# Patient Record
Sex: FEMALE | Race: WHITE | NOT HISPANIC OR LATINO | Employment: FULL TIME | URBAN - METROPOLITAN AREA
[De-identification: names, ages, dates, MRNs, and addresses within clinical notes are randomized per-mention and may not be internally consistent; named-entity substitution may affect disease eponyms.]

---

## 2017-10-31 ENCOUNTER — HOSPITAL ENCOUNTER (EMERGENCY)
Facility: HOSPITAL | Age: 52
Discharge: HOME | End: 2017-10-31
Attending: EMERGENCY MEDICINE | Admitting: EMERGENCY MEDICINE
Payer: COMMERCIAL

## 2017-10-31 VITALS
SYSTOLIC BLOOD PRESSURE: 148 MMHG | HEART RATE: 98 BPM | DIASTOLIC BLOOD PRESSURE: 84 MMHG | WEIGHT: 158 LBS | RESPIRATION RATE: 18 BRPM | TEMPERATURE: 98.7 F | OXYGEN SATURATION: 95 %
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
## 2017-10-31 PROCEDURE — 90715 TDAP VACCINE 7 YRS/> IM: CPT | Performed by: EMERGENCY MEDICINE

## 2017-10-31 PROCEDURE — 90675 RABIES VACCINE IM: CPT | Performed by: EMERGENCY MEDICINE

## 2017-10-31 PROCEDURE — 99283 EMERGENCY DEPT VISIT LOW MDM: CPT

## 2017-10-31 PROCEDURE — 90471 IMMUNIZATION ADMIN: CPT

## 2017-10-31 PROCEDURE — 90472 IMMUNIZATION ADMIN EACH ADD: CPT

## 2017-10-31 RX ADMIN — RABIES VACCINE 1 ML: KIT at 21:28

## 2017-10-31 RX ADMIN — TETANUS TOXOID, REDUCED DIPHTHERIA TOXOID AND ACELLULAR PERTUSSIS VACCINE, ADSORBED 0.5 ML: 5; 2.5; 8; 8; 2.5 SUSPENSION INTRAMUSCULAR at 21:36

## 2017-11-01 NOTE — DISCHARGE INSTRUCTIONS
Animal Bite   WHAT YOU NEED TO KNOW:   Animal bite injuries range from shallow cuts to deep, life-threatening wounds  An animal can cut or puncture the skin when it bites  Your skin may be torn from your body  Your skin may swell or bruise even if the bite does not break the skin  Animal bites occur more often on the hands, arms, legs, and face  Bites from dogs and cats are the most common injuries  DISCHARGE INSTRUCTIONS:   Seek care immediately if:   · You have a fever  · Your wound is red, swollen, and draining pus  · You see red streaks on the skin around the wound  · You can no longer move the bitten area  · Your heartbeat and breathing are much faster than usual     · You feel dizzy and confused  Contact your healthcare provider if:   · Your pain does not get better, even after you take pain medicine  · You have nightmares or flashbacks about the animal bite  · You have questions or concerns about your condition or care  Medicines: You may need any of the following:  · Antibiotics  prevent or treat a bacterial infection  · Prescription pain medicine  may be given  Ask how to take this medicine safely  · A tetanus vaccine  may be needed to prevent tetanus  Tetanus is a life-threatening bacterial infection that affects the nerves and muscles  The bacteria can be spread through animal bites  · A rabies vaccine  may be needed to prevent rabies  Rabies is a life-threatening viral infection  The virus can be spread through animal bites  · Take your medicine as directed  Contact your healthcare provider if you think your medicine is not helping or if you have side effects  Tell him or her if you are allergic to any medicine  Keep a list of the medicines, vitamins, and herbs you take  Include the amounts, and when and why you take them  Bring the list or the pill bottles to follow-up visits  Carry your medicine list with you in case of an emergency    Follow up with your healthcare provider in 1 to 2 days: You may need to return to have your stitches removed  Write down your questions so you remember to ask them during your visits  Self-care:   · Apply antibiotic ointment as directed  This helps prevent infection in minor skin wounds  It is available without a doctor's order  · Keep the wound clean and covered  Wash the wound every day with soap and water or germ-killing cleanser  Ask your healthcare provider about the kinds of bandages to use  · Apply ice on your wound  Ice helps decrease swelling and pain  Ice may also help prevent tissue damage  Use an ice pack, or put crushed ice in a plastic bag  Cover it with a towel and place it on your wound for 15 to 20 minutes every hour or as directed  · Elevate the wound area  Raise your wound above the level of your heart as often as you can  This will help decrease swelling and pain  Prop your wound on pillows or blankets to keep it elevated comfortably  Prevent another animal bite:   · Learn to recognize the signs of a scared or angry pet  Avoid quick, sudden movements  · Do not step between animals that are fighting  · Do not leave a pet alone with a young child  · Do not disturb an animal while it eats, sleeps, or cares for its young  · Do not approach an animal you do not know, especially one that is tied up or caged  · Stay away from animals that seem sick or act strangely  · Do not feed or capture wild animals  © 2017 [address] Information is for End User's use only and may not be sold, redistributed or otherwise used for commercial purposes  All illustrations and images included in CareNotes® are the copyrighted property of [employer] , [employer]  or Nicholas Dias  The above information is an  only  It is not intended as medical advice for individual conditions or treatments   Talk to your doctor, nurse or pharmacist before following any medical regimen to see if it is safe and effective for you  Rabies Vaccine (By injection)   Rabies Vaccine (RAY-beez VAX-een)  Prevents infection caused by rabies virus  The vaccine can be given before or after you are exposed to the rabies virus  Brand Name(s): Imovax Rabies, RabAvert   There may be other brand names for this medicine  When This Medicine Should Not Be Used: You should not receive a rabies vaccine if you have had an allergic reaction to it before  How to Use This Medicine:   Injectable  · Your doctor will prescribe your exact dose and tell you how often it should be given  This medicine is given as a shot into one of your muscles  The vaccine is injected into the upper arm muscle  Very young or small children may have the vaccine injected into the upper leg muscle  · A nurse or other health provider will give you this medicine  · You are at risk for exposure to the rabies virus if you work with animals or will be going to a country where rabies is common  People who are at risk of being exposed to rabies will receive 3 doses on 3 different days within a 1-month period  · If you have received the vaccine in the past and have been exposed to the rabies virus, you will need to receive 2 doses on 2 different days within a 1-month period  · If you have not yet received the vaccine and were exposed to the rabies virus, you will need a total of 5 doses on 5 different days within a 1-month period  You will also receive a shot of rabies immune globulin  · It is very important that you have the shots on the exact day your doctor tells you to  If a dose is missed:   · You must use this medicine on a fixed schedule  Call your doctor or pharmacist if you miss a dose  Drugs and Foods to Avoid:   Ask your doctor or pharmacist before using any other medicine, including over-the-counter medicines, vitamins, and herbal products    · Tell your doctor before you receive this vaccine if you take medicine that weakens your immune system, such as a steroid (such as dexamethasone, hydrocortisone, methylprednisolone, prednisolone, prednisone, Medrol®) or cancer treatment  Warnings While Using This Medicine:   · Make sure your doctor knows if you are pregnant or breastfeeding  Tell your doctor if you have had an allergic reaction to any type of vaccine, if you have an illness with fever, or if you have an immune system problem  · This medicine is made from donated human blood  Some human blood products have transmitted viruses to people who have received them, although the risk is low  Human donors and donated blood are both tested for viruses to keep the transmission risk low  Talk with your médico about this risk if you are concerned  · Your doctor will do lab tests at regular visits to check on the effects of this medicine  Keep all appointments  Possible Side Effects While Using This Medicine:   Call your doctor right away if you notice any of these side effects:  · Allergic reaction: Itching or hives, swelling in your face or hands, swelling or tingling in your mouth or throat, chest tightness, trouble breathing  · Muscle pain, stiffness, or weakness  · Numbness, tingling, or burning pain in your hands, arms, legs, or feet  If you notice these less serious side effects, talk with your doctor:   · Dizziness, headache  · Fever  · Itching, pain, redness, or swelling where the shot was given  · Nausea, stomach pain  · Tiredness  If you notice other side effects that you think are caused by this medicine, tell your doctor  Call your doctor for medical advice about side effects  You may report side effects to FDA at 5-659-FDA-2417  © 2017 [address]  Information is for End User's use only and may not be sold, redistributed or otherwise used for commercial purposes  The above information is an  only  It is not intended as medical advice for individual conditions or treatments   Talk to your doctor, nurse or pharmacist before following any medical regimen to see if it is safe and effective for you

## 2021-03-31 DIAGNOSIS — Z23 ENCOUNTER FOR IMMUNIZATION: ICD-10-CM

## 2021-06-16 NOTE — ED PROVIDER NOTES
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  pt presents to the ed with a bite darius to the 2nd left finger      Patient presents for evaluation of cat bite to the finger  Left second finger  Patient states rescue cat  Already given Augmentin, but sent in for rabies ppx  PAtient has received the rabies vaccine in the past          History provided by:  Patient   used: No    Cat Bite       None       Past Medical History:   Diagnosis Date    Asthma     Lyme disease        History reviewed  No pertinent surgical history  History reviewed  No pertinent family history  I have reviewed and agree with the history as documented  Social History   Substance Use Topics    Smoking status: Not on file    Smokeless tobacco: Not on file    Alcohol use Not on file        Review of Systems   Skin: Positive for wound  All other systems reviewed and are negative  Physical Exam  ED Triage Vitals [10/31/17 2029]   Temperature Pulse Respirations Blood Pressure SpO2   98 7 °F (37 1 °C) 98 18 148/84 95 %      Temp Source Heart Rate Source Patient Position - Orthostatic VS BP Location FiO2 (%)   Tympanic -- -- -- --      Pain Score       --           Orthostatic Vital Signs  Vitals:    10/31/17 2029   BP: 148/84   Pulse: 98       Physical Exam   Constitutional: She is oriented to person, place, and time  No distress  Cardiovascular: Normal rate and regular rhythm  Pulmonary/Chest: Effort normal and breath sounds normal  No respiratory distress  Musculoskeletal: Normal range of motion  Neurological: She is alert and oriented to person, place, and time  Skin: Capillary refill takes less than 2 seconds  She is not diaphoretic  Nursing note and vitals reviewed        ED Medications  Medications   rabies vaccine, chick embryo (RABAVERT) IM injection 1 mL (1 mL Intramuscular Given 10/31/17 2128) tetanus-diphtheria-acellular pertussis (BOOSTRIX) IM injection 0 5 mL (0 5 mL Intramuscular Given 10/31/17 2131)       Diagnostic Studies  Results Reviewed     None                 No orders to display              Procedures  Procedures       Phone Contacts  ED Phone Contact    ED Course  ED Course                                MDM  Number of Diagnoses or Management Options  Cat bite, initial encounter:   Patient Progress  Patient progress: stable    CritCare Time    Disposition  Final diagnoses:   Cat bite, initial encounter     Time reflects when diagnosis was documented in both MDM as applicable and the Disposition within this note     Time User Action Codes Description Comment    10/31/2017  8:55 PM Butch, [facility] Cat bite, initial encounter       ED Disposition     ED Disposition Condition Comment    Discharge  Gem Ramírez discharge to home/self care  Condition at discharge: stable        Follow-up Information     Follow up With Specialties Details Why [facility] [address] Emergency Department Emergency Medicine In 3 days for second vaccine dose [address] [facility] ED, [place], [place], [address]        There are no discharge medications for this patient  No discharge procedures on file      ED Provider  Electronically Signed by           Nelly Victoria DO  10/31/17 [facility],   10/31/17 [facility]386 [facility]

## 2021-12-31 ENCOUNTER — NURSE TRIAGE (OUTPATIENT)
Dept: OTHER | Facility: OTHER | Age: 56
End: 2021-12-31

## 2021-12-31 DIAGNOSIS — Z20.828 EXPOSURE TO SARS VIRUS: Primary | ICD-10-CM

## 2022-01-02 PROCEDURE — U0005 INFEC AGEN DETEC AMPLI PROBE: HCPCS | Performed by: FAMILY MEDICINE

## 2022-01-02 PROCEDURE — U0003 INFECTIOUS AGENT DETECTION BY NUCLEIC ACID (DNA OR RNA); SEVERE ACUTE RESPIRATORY SYNDROME CORONAVIRUS 2 (SARS-COV-2) (CORONAVIRUS DISEASE [COVID-19]), AMPLIFIED PROBE TECHNIQUE, MAKING USE OF HIGH THROUGHPUT TECHNOLOGIES AS DESCRIBED BY CMS-2020-01-R: HCPCS | Performed by: FAMILY MEDICINE

## 2024-10-25 ENCOUNTER — OFFICE VISIT (OUTPATIENT)
Dept: OBGYN CLINIC | Facility: CLINIC | Age: 59
End: 2024-10-25
Payer: COMMERCIAL

## 2024-10-25 ENCOUNTER — APPOINTMENT (OUTPATIENT)
Dept: RADIOLOGY | Facility: CLINIC | Age: 59
End: 2024-10-25
Payer: COMMERCIAL

## 2024-10-25 VITALS
SYSTOLIC BLOOD PRESSURE: 126 MMHG | DIASTOLIC BLOOD PRESSURE: 84 MMHG | HEART RATE: 76 BPM | WEIGHT: 151.2 LBS | BODY MASS INDEX: 25.19 KG/M2 | RESPIRATION RATE: 16 BRPM | HEIGHT: 65 IN

## 2024-10-25 DIAGNOSIS — M25.521 RIGHT ELBOW PAIN: Primary | ICD-10-CM

## 2024-10-25 DIAGNOSIS — M77.11 LATERAL EPICONDYLITIS OF RIGHT ELBOW: ICD-10-CM

## 2024-10-25 DIAGNOSIS — M25.521 RIGHT ELBOW PAIN: ICD-10-CM

## 2024-10-25 PROCEDURE — 99204 OFFICE O/P NEW MOD 45 MIN: CPT | Performed by: STUDENT IN AN ORGANIZED HEALTH CARE EDUCATION/TRAINING PROGRAM

## 2024-10-25 PROCEDURE — 73080 X-RAY EXAM OF ELBOW: CPT

## 2024-10-25 NOTE — PROGRESS NOTES
ORTHOPAEDIC HAND, WRIST, AND ELBOW OFFICE  VISIT     ASSESSMENT/PLAN:    Claudia García is a 59 y.o. RHD female who presents with right elbow lateral epicondylitis     Etiology and treatment options discussed, all her questions were addressed. Emphasized that this can take 3-6 months to resolve.  Referral to hand therapy provided, I recommend continued course of formal therapy before discussing additional treatment options.  Continue with OTC analgesics as needed for pain, activities as tolerated.         The patient verbalized understanding of exam findings and treatment plan. We engaged in the shared decision-making process and treatment options were discussed at length with the patient. Surgical and conservative management discussed today along with risks and benefits.    Follow Up:  If symptoms worsen or fail to improve       General Discussions:  Lateral Epicondylitis: The anatomy and physiology of lateral epicondylitis was discussed with the patient today.  Typically, a traumatic injury or repetitive use may cause a partial or complete tear of the extensor carpi radialis brevis muscle.  This creates pain over the lateral epicondyle.  This pain typically is made worse with palm down lifting activities as well as anything that involves strength and stability of the wrist.  The pain may radiate from the wrist up to the elbow.  At times, the shoulder may be weak as well which can predispose or cause continuation of the problem.  Conservative treatment usually cures a majority of patients; however, this may take up to 6-9 months.  Conservative treatment options typically include activity modification, therapy for strengthening of the shoulder and elbow, nocturnal wrist support splints, tennis elbow straps, and possible corticosteroid injections.  Corticosteroid injections do not change the natural history of this process, may decrease the pain temporarily, and may increase the risk of recurrence.  Surgery is  "required in fewer than 10% of patients.      ____________________________________________________________________________________________________________________________________________      CHIEF COMPLAINT:  Right elbow pain     SUBJECTIVE:  Claudia García is a 59 y.o. female who presents with right elbow pain. Pain started recently with out injury or trauma. Pain is localized to the lateral elbow, aggravated with lifting heavy items, alleviated with rest. Patient has tried multiple at home treatments with out benefit. Pain does not radiate. She denies weakness or limits in AROM. Denies numbness or paresthesias.   Previous Treatments: NSAIDs, Tylenol, activity modification, and bracing without relief  Associated symptoms: None  Handedness: right  Work status: retired    I have personally reviewed all the relevant PMH, PSH, SH, FH, Medications and allergies      PAST MEDICAL HISTORY:  Past Medical History:   Diagnosis Date    Asthma     Lyme disease        PAST SURGICAL HISTORY:  History reviewed. No pertinent surgical history.    FAMILY HISTORY:  History reviewed. No pertinent family history.    SOCIAL HISTORY:       MEDICATIONS:  No current outpatient medications on file.    ALLERGIES:  Allergies   Allergen Reactions    Sulfa Antibiotics            REVIEW OF SYSTEMS:  Pertinent items are noted in HPI.  A comprehensive review of systems was negative.    VITALS:  Vitals:    10/25/24 0915   BP: 126/84   Pulse: 76   Resp: 16       LABS:  HgA1c: No results found for: \"HGBA1C\"  BMP: No results found for: \"GLUCOSE\", \"CALCIUM\", \"NA\", \"K\", \"CO2\", \"CL\", \"BUN\", \"CREATININE\"    _____________________________________________________  PHYSICAL EXAMINATION:  General: well developed and well nourished, alert, oriented times 3, and appears comfortable  Psychiatric: Normal  HEENT: Normocephalic, Atraumatic Trachea Midline, No torticollis  Pulmonary: No audible wheezing or respiratory distress   Abdomen/GI: Non tender, non distended " "  Cardiovascular: No pitting edema, 2+ radial pulse   Skin: No masses, erythema, lacerations, fluctation, ulcerations  Neurovascular: Sensation Intact to the Median, Ulnar, Radial Nerve, Motor Intact to the Median, Ulnar, Radial Nerve, and Pulses Intact  Musculoskeletal: Normal, except as noted in detailed exam and in HPI.        FOCUSED MUSCULOSKELETAL EXAMINATION:  Right Upper Extremity  Inspection: skin intact, no notable deformity   Palpation: TTP lateral epicondyle   Neurologic: 5/5 elbow flexion, 5/5 elbow extension, 5/5 wrist extension, 5/5 wrist flexion, 5/5 finger flexion, 5/5 finger extension, 5/5 FPL, 5/5 EPL, 5/5 APB, 5/5 intrinsics, sensation intact to median, radial, and ulnar nerve distributions  Vascular: Palpable radial pulse, brisk cap refill <2sec, hand warm and well perfused  MSK:     + pain with resisted wrist extension   no palpable crepitus with passive motion  (-) varus laxity, (-) valgus laxity  Demonstrates normal shoulder, wrist, and finger motion  2+ distal radial pulse with brisk capillary refill to the fingers  Radial, median, and ulnar motor and sensory distrubution intact  Sensation to light touch intact distally     ___________________________________________________  STUDIES REVIEWED:  Xrays of the right elbow were obtained on 10/25/24 were independently reviewed which demonstrates no acute osseous abnormalities     LABS REVIEWED:    HgA1c: No results found for: \"HGBA1C\"  BMP: No results found for: \"GLUCOSE\", \"CALCIUM\", \"NA\", \"K\", \"CO2\", \"CL\", \"BUN\", \"CREATININE\"        PROCEDURES PERFORMED:  Procedures  No Procedures performed today    _____________________________________________________      Scribe Attestation      I,:  Belia Tamez am acting as a scribe while in the presence of the attending physician.:       I,:  Tre Urias MD personally performed the services described in this documentation    as scribed in my presence.:               I agree with the history, " physical examination, assessment and plan of care as documented above.    Tre Urias M.D.  Attending, Orthopaedic Surgery  Hand, Wrist, and Elbow Surgery  [facility]

## 2024-10-30 ENCOUNTER — EVALUATION (OUTPATIENT)
Dept: OCCUPATIONAL THERAPY | Facility: CLINIC | Age: 59
End: 2024-10-30
Payer: COMMERCIAL

## 2024-10-30 DIAGNOSIS — M25.521 RIGHT ELBOW PAIN: ICD-10-CM

## 2024-10-30 DIAGNOSIS — M77.11 LATERAL EPICONDYLITIS OF RIGHT ELBOW: Primary | ICD-10-CM

## 2024-10-30 PROCEDURE — 97110 THERAPEUTIC EXERCISES: CPT

## 2024-10-30 PROCEDURE — 97166 OT EVAL MOD COMPLEX 45 MIN: CPT

## 2024-10-30 NOTE — PROGRESS NOTES
OT Evaluation     Today's date: 10/30/2024  Patient name: Claudia García  : 1965  MRN: [id number]  Referring provider: Tre Urias*  Dx:   Encounter Diagnosis     ICD-10-CM    1. Right elbow pain  M25.521 Ambulatory Referral to PT/OT Hand Therapy      2. Lateral epicondylitis of right elbow  M77.11 Ambulatory Referral to PT/OT Hand Therapy          Start Time: 1130  Stop Time: 1215  Total time in clinic (min): 45 minutes    Assessment  Impairments: activity intolerance, impaired physical strength, lacks appropriate home exercise program, pain with function and weight-bearing intolerance  Symptom irritability: moderate  Understanding of Dx/Px/POC: good     Prognosis: excellent    Goals  Short Term Goals by 2 - 4 weeks:    Establish HEP to increase performance with daily activities.     Patient will increase  strength by at least 5 lbs to assist in completing ADLs/iADLs.         Long Term Goals by discharge:    Establish final home exercise program to enhance maximal functional level with ADLs.    Achieve functional strength of RUE for full return to high level ADLs.     Plan  Patient would benefit from: skilled occupational therapy, OT eval, orthotics and custom splinting  Planned modality interventions: manual electrical stimulation, electrical stimulation/Russian stimulation, high voltage pulsed current: pain management, ultrasound, TENS and thermotherapy: hydrocollator packs    Planned therapy interventions: ADL training, ADL retraining, activity modification, IASTM, joint mobilization, kinesiology taping, manual therapy, neuromuscular re-education, nerve gliding, orthotic fitting/training, orthotic management and training, patient/caregiver education, self care, sensory integrative techniques, strengthening, stretching, therapeutic exercise, therapeutic activities, therapeutic training, IADL retraining, home exercise program, graded motor, graded exercise, graded activity, functional ROM  "exercises, flexibility, fine motor coordination training, coordination, compression, balance/weight bearing training, body mechanics training and behavior modification    Frequency: 2x week  Duration in weeks: 10  Plan of Care beginning date: 10/30/2024  Plan of Care expiration date: 2024  Treatment plan discussed with: patient  Plan details: Focus on HEP, A/AA/PROM, TE, TA, manual therapy, modalities PRN, strengthening, activity modification to improve ability to complete daily activites with ease.  POC 10/30/2024 - 2024        Subjective Evaluation    History of Present Illness  Mechanism of injury: Patient is a 59 y.o. female who presents for OT IE and treatment for R lateral epicondylitis. Patient reports her right elbow pain starting around Mid July, she feels it more when she reaches up high and extends her elbow, bringing in grocery bags in or carrying luggage. She wears counter force brace but it provides minimal relief but resting her arm helps to get rid of the pain. Patient referred by Dr. Tre Urias to initiate treatment including hand therapy.      Quality of life: good    Patient Goals  Patient goals for therapy: decreased pain, increased motion, return to sport/leisure activities, independence with ADLs/IADLs and increased strength    Pain  Current pain ratin  At best pain ratin (\"I can always feels something going on in the elbow\")  At worst pain ratin  Quality: sharp  Relieving factors: rest (advil/aleve PRN seldom)  Aggravating factors: lifting (carying luggage, grocery bags, pulling laundry out of the washer, helps take care of 2 twin grandchildren, a puppy)    Social Support  Lives with: spouse    Working: semi-retired.  Hand dominance: right      Objective     Active Range of Motion     Left Wrist   Normal active range of motion    Right Wrist   Normal active range of motion    Left Thumb   Kapandji score: 10 degrees      Right Thumb   Kapandji score: 10 " degrees    Additional Active Range of Motion Details  Patient able to make bilateral composite fists     Strength/Myotome Testing     Left Wrist/Hand      (2nd hand position)     Trial 1: 65    Comments: Pronated  :  60 lbs     Thumb Strength  Key/Lateral Pinch     Trial 1: 11    Right Wrist/Hand      (2nd hand position)     Trial 1: 40    Comments: Pronated :  35 lbs     Thumb Strength   Key/Lateral Pinch     Trial 1: 12    Tests     Left Elbow   Negative Cozen's.     Right Elbow   Positive Cozen's.     Left Wrist/Hand   Negative resisted middle finger.     Right Wrist/Hand   Positive resisted middle finger.            Precautions: Universal        Auth Tracker  Auth Status Total   Visits  Expiration date Co-Insurance   pending                                  Visit Tracker  Date IE  10/30                                                                                    PMHx:   has a past medical history of Asthma and Lyme disease.       Manuals HEP 10/30/2024                       Ther Ex     Education on HEP and dx x x5min   Extensor stretches 4 ways  x 5 x10 sec holds   STM lateral epicondyle x X5 min   measurements  X10 min   Wrist flex/slow ext  1# x10                        Ther Act                     Modalities     MHP  x5 min               Assessment:   Patient tolerated session well. Patient demonstrates decreased  strength as well discomfort with certain motions upon assessment today. Upon palpation patient does present with increased subjective report of tenderness at the R lateral epicondyle. Patient session focused on patient education on anatomy and physiology concerning current dx, techniques for decreasing deficits through HEP, and appropriate use of modalities. Patient educated on HEP to include wrist extensor muscle stretches, activity modifications with ADLs/iADLs specifically how to travel with luggage/duffel bag/tote bag purse with verbal instructions and handouts for  patient reference. Patient educated on treatment plan at this time. Patient benefiting from skilled hand therapy OT to reduce deficits to improve independence with daily activities.      Plan:   Focus on HEP, A/AA/PROM, TE, TA, manual therapy, modalities PRN, strengthening, activity modification to improve ability to complete daily activites with ease.  POC 10/30/2024 - 12/20/2024

## 2024-11-13 ENCOUNTER — OFFICE VISIT (OUTPATIENT)
Dept: OCCUPATIONAL THERAPY | Facility: CLINIC | Age: 59
End: 2024-11-13
Payer: COMMERCIAL

## 2024-11-13 DIAGNOSIS — M77.11 LATERAL EPICONDYLITIS OF RIGHT ELBOW: Primary | ICD-10-CM

## 2024-11-13 DIAGNOSIS — M25.521 RIGHT ELBOW PAIN: ICD-10-CM

## 2024-11-13 PROCEDURE — 97110 THERAPEUTIC EXERCISES: CPT

## 2024-11-13 NOTE — PROGRESS NOTES
Daily Note     Today's date: 2024  Patient name: Claudia García  : 1965  MRN: [id number]  Referring provider: Tre Urias*  Dx:   Encounter Diagnosis     ICD-10-CM    1. Lateral epicondylitis of right elbow  M77.11       2. Right elbow pain  M25.521                    Subjective: Patient states compliance with HEP, has been moving boxes and luggage while in [place] states minimal pain or soreness in the R elbow.         Objective: See treatment diary below       Precautions: Universal        Auth Tracker - NO AUTH BOMN 150 PCY   Auth Status Total   Visits  Expiration date Co-Insurance                                     Visit Tracker  Date IE  10/30 11/13                                                                                   PMHx:   has a past medical history of Asthma and Lyme disease.       Manuals HEP 2024                       Ther Ex     Education on HEP and dx x x5min   Extensor stretches 4 ways  x 5 x10-20 sec holds   STM lateral epicondyle/PROM x X8 min   measurements     Wrist flex/slow ext  1# x10    Digital extension with rubber band   Red x10    Kenny twist with flexbar, N's/U's  Red x10    Tabletop stretch  5 x10-20 sec holds   Flexor stretch   5 x10 sec   Prayer stretch   5 x10 sec        Ther Act                     Modalities     MHP  x5 min               Assessment:   Patient tolerated session well with good activity tolerance and minimal reports of tenderness or discomfort. Patient session focused on patient education on anatomy and physiology concerning current dx, techniques for decreasing deficits through HEP, and appropriate use of modalities. Therapist reviewed HEP to include wrist extensor muscle stretches, activity modifications with ADLs/iADLs specifically with verbal instructions for patient reference. Patient educated on treatment plan at this time. Patient benefiting from skilled hand therapy OT to reduce deficits to improve independence with  daily activities.        Plan:   Focus on HEP, A/AA/PROM, TE, TA, manual therapy, modalities PRN, strengthening, activity modification to improve ability to complete daily activites with ease.  POC 10/30/2024 - 12/20/2024

## 2024-11-21 ENCOUNTER — OFFICE VISIT (OUTPATIENT)
Dept: OCCUPATIONAL THERAPY | Facility: CLINIC | Age: 59
End: 2024-11-21
Payer: COMMERCIAL

## 2024-11-21 DIAGNOSIS — M77.11 LATERAL EPICONDYLITIS OF RIGHT ELBOW: Primary | ICD-10-CM

## 2024-11-21 DIAGNOSIS — M25.521 RIGHT ELBOW PAIN: ICD-10-CM

## 2024-11-21 PROCEDURE — 97110 THERAPEUTIC EXERCISES: CPT

## 2024-11-21 NOTE — PROGRESS NOTES
"Daily Note     Today's date: 2024  Patient name: Claudia García  : 1965  MRN: [id number]  Referring provider: Tre Urias*  Dx:   Encounter Diagnosis     ICD-10-CM    1. Lateral epicondylitis of right elbow  M77.11       2. Right elbow pain  M25.521                      Subjective: Patient states, \"sometimes it's fine I forget there's any pain in the elbow and other times it's a sharp reminder\".        Objective: See treatment diary below       Precautions: Universal        Auth Tracker - NO AUTH BOMN 150 PCY   Auth Status Total   Visits  Expiration date Co-Insurance                                     Visit Tracker  Date IE  10/30 11/13 11/21   X                                                                               PMHx:   has a past medical history of Asthma and Lyme disease.       Manuals HEP 2024                       Ther Ex     Education on HEP and dx x x5min   Extensor stretches 4 ways  x 5 x10-20 sec holds   STM lateral epicondyle/PROM x X8 min   measurements     Wrist flex/slow ext  1# x10    Digital extension with rubber band   Red x10    Kenny twist with flexbar, N's/U's  Red x10    Tabletop stretch  5 x10-20 sec holds   Flexor stretch   5 x10 sec   Prayer stretch   5 x10 sec   Supination/pronation  X10 1 wts.                  Ther Act                     Modalities     MHP  x5 min               Assessment:   Patient tolerated session well with good activity tolerance and minimal reports of tenderness or discomfort. Patient session focused on patient education on anatomy and physiology concerning current dx, techniques for decreasing deficits through HEP, and appropriate use of modalities. Patient educated on treatment plan at this time. Patient benefiting from skilled hand therapy OT to reduce deficits to improve independence with daily activities.        Plan:   Focus on HEP, A/AA/PROM, TE, TA, manual therapy, modalities PRN, strengthening, activity modification to " improve ability to complete daily activites with ease.  POC 10/30/2024 - 12/20/2024

## 2024-11-26 ENCOUNTER — OFFICE VISIT (OUTPATIENT)
Dept: OCCUPATIONAL THERAPY | Facility: CLINIC | Age: 59
End: 2024-11-26
Payer: COMMERCIAL

## 2024-11-26 DIAGNOSIS — M77.11 LATERAL EPICONDYLITIS OF RIGHT ELBOW: Primary | ICD-10-CM

## 2024-11-26 DIAGNOSIS — M25.521 RIGHT ELBOW PAIN: ICD-10-CM

## 2024-11-26 PROCEDURE — 97110 THERAPEUTIC EXERCISES: CPT

## 2024-11-26 NOTE — PROGRESS NOTES
Daily Note     Today's date: 2024  Patient name: Claudia García  : 1965  MRN: [id number]  Referring provider: Tre Urias*  Dx:   Encounter Diagnosis     ICD-10-CM    1. Lateral epicondylitis of right elbow  M77.11       2. Right elbow pain  M25.521                    Subjective: Patient states improvement but still feels difficulty when lifting reusable grocery bags.         Objective: See treatment diary below       Precautions: Universal      Auth Tracker - NO AUTH BOMN 150 PCY   Auth Status Total   Visits  Expiration date Co-Insurance                                     Visit Tracker  Date IE  10/30 11/13 11/21 11/26   X                                                                               PMHx:   has a past medical history of Asthma and Lyme disease.       Manuals HEP 2024                       Ther Ex     Education on HEP and dx x x5min   Extensor stretches 4 ways  x 5 x10-20 sec holds   STM lateral epicondyle/PROM x X8 min   measurements     Wrist flex/slow ext  1# x10    Digital extension with rubber band   Red x10    Kenny twist with flexbar, N's/U's  Red x10    Tabletop stretch  5 x10-20 sec holds   Flexor stretch   5 x10 sec   Prayer stretch   5 x10 sec   Supination/pronation  X10 1 wts.                  Ther Act                     Modalities     MHP  x5 min               Assessment:   Patient tolerated session well with good activity tolerance and no reports of tenderness or discomfort. Patient session focused on patient education on anatomy and physiology concerning current dx, techniques for decreasing deficits through HEP, and appropriate use of modalities. Patient educated on treatment plan at this time. Patient benefiting from skilled hand therapy OT to reduce deficits to improve independence with daily activities.        Plan:   Focus on HEP, A/AA/PROM, TE, TA, manual therapy, modalities PRN, strengthening, activity modification to improve ability to complete  daily activites with ease.  POC 10/30/2024 - 12/20/2024

## 2024-12-03 ENCOUNTER — OFFICE VISIT (OUTPATIENT)
Dept: OCCUPATIONAL THERAPY | Facility: CLINIC | Age: 59
End: 2024-12-03
Payer: COMMERCIAL

## 2024-12-03 DIAGNOSIS — M25.521 RIGHT ELBOW PAIN: ICD-10-CM

## 2024-12-03 DIAGNOSIS — M77.11 LATERAL EPICONDYLITIS OF RIGHT ELBOW: Primary | ICD-10-CM

## 2024-12-03 PROCEDURE — 97110 THERAPEUTIC EXERCISES: CPT

## 2024-12-03 NOTE — PROGRESS NOTES
OT Re-Evaluation     Today's date: 12/3/2024  Patient name: Claudia García  : 1965  MRN: [id number]  Referring provider: Tre Urias*  Dx:   Encounter Diagnosis     ICD-10-CM    1. Right elbow pain  M25.521       2. Lateral epicondylitis of right elbow  M77.11           Start Time: 1630  Stop Time: 1715  Total time in clinic (min): 45 minutes    Assessment  Impairments: activity intolerance, impaired physical strength, lacks appropriate home exercise program, pain with function and weight-bearing intolerance  Symptom irritability: low  Understanding of Dx/Px/POC: good     Prognosis: excellent    Goals  Short Term Goals by 2 - 4 weeks:    Establish HEP to increase performance with daily activities. MET    Patient will increase  strength by at least 5 lbs to assist in completing ADLs/iADLs. MET      Updated ST weeks  Patient will decrease subjective report of pain by 2 points on the VAS.        Long Term Goals by discharge:    Establish final home exercise program to enhance maximal functional level with ADLs.    Achieve functional strength of RUE for full return to high level ADLs.     Plan  Patient would benefit from: skilled occupational therapy, OT eval, orthotics and custom splinting  Planned modality interventions: manual electrical stimulation, electrical stimulation/Russian stimulation, high voltage pulsed current: pain management, ultrasound, TENS and thermotherapy: hydrocollator packs    Planned therapy interventions: ADL training, ADL retraining, activity modification, IASTM, joint mobilization, kinesiology taping, manual therapy, neuromuscular re-education, nerve gliding, orthotic fitting/training, orthotic management and training, patient/caregiver education, self care, sensory integrative techniques, strengthening, stretching, therapeutic exercise, therapeutic activities, therapeutic training, IADL retraining, home exercise program, graded motor, graded exercise, graded activity,  functional ROM exercises, flexibility, fine motor coordination training, coordination, compression, balance/weight bearing training, body mechanics training and behavior modification    Frequency: 1-2x week  Duration in weeks: 10  Plan of Care beginning date: 12/3/2024  Plan of Care expiration date: 2025  Treatment plan discussed with: patient  Plan details: Focus on HEP, A/AA/PROM, TE, TA, manual therapy, modalities PRN, strengthening, activity modification to improve ability to complete daily activites with ease.  POC 12/3/2024 - 2025        Subjective Evaluation    History of Present Illness  Mechanism of injury: Patient is a 59 y.o. female who presents for OT IE and treatment for R lateral epicondylitis. Patient reports her right elbow pain starting around Mid July, she feels it more when she reaches up high and extends her elbow, bringing in grocery bags in or carrying luggage. She wears counter force brace but it provides minimal relief but resting her arm helps to get rid of the pain. Patient referred by Dr. Tre Urias to initiate treatment including hand therapy.      Quality of life: good    Patient Goals  Patient goals for therapy: decreased pain, return to sport/leisure activities, independence with ADLs/IADLs and increased strength    Pain  Current pain ratin  At best pain ratin  At worst pain ratin  Quality: sharp  Relieving factors: rest (advil/aleve PRN seldom)  Exacerbated by: carying luggage, helps take care of 2 twin grandchildren.    Social Support  Lives with: spouse    Working: semi-retired.  Hand dominance: right      Objective     Active Range of Motion     Left Wrist   Normal active range of motion    Right Wrist   Normal active range of motion    Left Thumb   Kapandji score: 10 degrees      Right Thumb   Kapandji score: 10 degrees    Additional Active Range of Motion Details  Patient able to make bilateral composite fists     Strength/Myotome Testing     Left  Wrist/Hand      (2nd hand position)     Trial 1: 75    Comments: Pronated  :  72 lbs     Thumb Strength  Key/Lateral Pinch     Trial 1: 12    Right Wrist/Hand      (2nd hand position)     Trial 1: 68    Comments: Pronated :  65 lbs     Thumb Strength   Key/Lateral Pinch     Trial 1: 14    Tests     Left Elbow   Negative Cozen's.     Right Elbow   Negative Cozen's.     Left Wrist/Hand   Negative resisted middle finger.     Right Wrist/Hand   Negative resisted middle finger.              Precautions: Universal      Auth Tracker - NO AUTH BOMN 150 PCY   Auth Status Total   Visits  Expiration date Co-Insurance                                     Visit Tracker  Date IE  10/30 11/13 11/21 11/26  12/3  RE X                                                                               PMHx:   has a past medical history of Asthma and Lyme disease.       Manuals HEP 12/3/2024                       Ther Ex     Education on HEP and dx x x5min   Extensor stretches 4 ways  x 5 x10-20 sec holds   STM lateral epicondyle/PROM x X8 min   measurements     Wrist flex/slow ext  2# x10    Digital extension with rubber band   Red x10    Kenny twist with flexbar, N's/U's  Red x10    Tabletop stretch  5 x10-20 sec holds   Flexor stretch   5 x10 sec   Prayer stretch   5 x10 sec   Supination/pronation  X10 1 wts.   measurements  x5min             Ther Act                     Modalities     MHP  x5 min    Kinesiotape R lateral epicondyle  x         Assessment:   Patient re-evaluation done this this date with noted improvement in  and pinch strength with neutral and pronated . Patient has met all short term goals and is making good progress towards long term goals. Patient tolerated session well with good activity tolerance and no reports of tenderness or discomfort. Patient session focused on patient education on anatomy and physiology concerning current dx, techniques for decreasing deficits through HEP, and  appropriate use of modalities. Kinesio tape was placed at the R lateral epicondyle for support, positioning and protection to facilitate improvement in symptoms. Reviewed with patient about potential adverse effects of the Kinesiotape. Patient educated on treatment plan at this time. Patient benefiting from skilled hand therapy OT to reduce deficits to improve independence with daily activities.      Plan:   Focus on HEP, A/AA/PROM, TE, TA, manual therapy, modalities PRN, strengthening, activity modification to improve ability to complete daily activites with ease.  POC 12/3/2024 - 1/28/2025

## 2024-12-05 ENCOUNTER — OFFICE VISIT (OUTPATIENT)
Dept: OCCUPATIONAL THERAPY | Facility: CLINIC | Age: 59
End: 2024-12-05
Payer: COMMERCIAL

## 2024-12-05 DIAGNOSIS — M77.11 LATERAL EPICONDYLITIS OF RIGHT ELBOW: ICD-10-CM

## 2024-12-05 DIAGNOSIS — M25.521 RIGHT ELBOW PAIN: Primary | ICD-10-CM

## 2024-12-05 PROCEDURE — 97110 THERAPEUTIC EXERCISES: CPT

## 2024-12-05 NOTE — PROGRESS NOTES
Daily Note     Today's date: 2024  Patient name: Claudia García  : 1965  MRN: [id number]  Referring provider: Tre Urias*  Dx:   Encounter Diagnosis     ICD-10-CM    1. Right elbow pain  M25.521       2. Lateral epicondylitis of right elbow  M77.11                  Subjective: Patient reports mild allergic reaction at lateral epicondyle due to kinesiotape.        Objective: See treatment diary below       Precautions: Universal      Auth Tracker - NO AUTH BOMN 150 PCY   Auth Status Total   Visits  Expiration date Co-Insurance                                     Visit Tracker  Date IE  10/30 11/13 11/21 11/26  12/3  RE   X                                                                               PMHx:   has a past medical history of Asthma and Lyme disease.       Manuals HEP 2024                       Ther Ex     Education on HEP and dx x x5min   Extensor stretches 4 ways  x 5 x10-20 sec holds   STM lateral epicondyle/PROM x X8 min   measurements     Wrist flex/slow ext  2# x10    Digital extension with rubber band   Red x10    Kenny twist with flexbar  Red x10    Tabletop stretch  5 x10-20 sec holds   Flexor stretch   5 x10 sec   Prayer stretch   5 x10 sec   Supination/pronation  X10 1 wts.   measurements               Ther Act                     Modalities     MHP  x5 min    Kinesiotape R lateral epicondyle         Assessment:   Patient tolerated session well with good activity tolerance and minimal reports of sharp pain at lateral epicondyle. Patient session focused on patient education on anatomy and physiology concerning current dx, techniques for decreasing deficits through HEP, and appropriate use of modalities. Patient educated on treatment plan at this time. Patient benefiting from skilled hand therapy OT to reduce deficits to improve independence with daily activities.        Plan:   Focus on HEP, A/AA/PROM, TE, TA, manual therapy, modalities PRN, strengthening, activity  modification to improve ability to complete daily activites with ease.  POC 12/3/2024 - 1/28/2025

## 2024-12-09 ENCOUNTER — OFFICE VISIT (OUTPATIENT)
Dept: OCCUPATIONAL THERAPY | Facility: CLINIC | Age: 59
End: 2024-12-09
Payer: COMMERCIAL

## 2024-12-09 DIAGNOSIS — M77.11 LATERAL EPICONDYLITIS OF RIGHT ELBOW: ICD-10-CM

## 2024-12-09 DIAGNOSIS — M25.521 RIGHT ELBOW PAIN: Primary | ICD-10-CM

## 2024-12-09 PROCEDURE — 97110 THERAPEUTIC EXERCISES: CPT

## 2024-12-09 NOTE — PROGRESS NOTES
Daily Note     Today's date: 2024  Patient name: Claudia García  : 1965  MRN: [id number]  Referring provider: Tre Urias*  Dx:   Encounter Diagnosis     ICD-10-CM    1. Right elbow pain  M25.521       2. Lateral epicondylitis of right elbow  M77.11                    Subjective: Patient reports she didn't do too much strenuous lifting over the weekend besides holiday activities decorating cookies and so elbow is feeling okay at this time.        Objective: See treatment diary below       Precautions: Universal      Auth Tracker - NO AUTH BOMN 150 PCY   Auth Status Total   Visits  Expiration date Co-Insurance                                     Visit Tracker  Date IE  10/30 11/13 11/21 11/26  12/3  RE 12/5  X    12/9                                                                           PMHx:   has a past medical history of Asthma and Lyme disease.       Manuals HEP 2024                       Ther Ex     Education on HEP and dx x x5min   Extensor stretches 4 ways  x 5 x10-20 sec holds   STM lateral epicondyle/PROM x X8 min   measurements     Wrist flex/slow ext  2# x10    Digital extension with rubber band   Red x10    Kenny twist with flexbar  Red x10    Tabletop stretch  5 x10-20 sec holds   Flexor stretch   5 x10 sec   Prayer stretch   5 x10 sec   Supination/pronation  X10 1 wts.                  Ther Act                     Modalities     MHP  x5 min             Assessment:   Patient tolerated session well with good activity tolerance and minimal reports of sharp pain at lateral epicondyle. Patient session focused on patient education on anatomy and physiology concerning current dx, techniques for decreasing deficits through HEP, and appropriate use of modalities. Patient educated on treatment plan at this time. Patient benefiting from skilled hand therapy OT to reduce deficits to improve independence with daily activities.        Plan:   Focus on HEP, A/AA/PROM, TE, TA, manual  therapy, modalities PRN, strengthening, activity modification to improve ability to complete daily activites with ease.  POC 12/3/2024 - 1/28/2025

## 2024-12-11 ENCOUNTER — OFFICE VISIT (OUTPATIENT)
Dept: OCCUPATIONAL THERAPY | Facility: CLINIC | Age: 59
End: 2024-12-11
Payer: COMMERCIAL

## 2024-12-11 DIAGNOSIS — M77.11 LATERAL EPICONDYLITIS OF RIGHT ELBOW: Primary | ICD-10-CM

## 2024-12-11 DIAGNOSIS — M25.521 RIGHT ELBOW PAIN: ICD-10-CM

## 2024-12-11 PROCEDURE — 97110 THERAPEUTIC EXERCISES: CPT

## 2024-12-11 NOTE — PROGRESS NOTES
Daily Note     Today's date: 2024  Patient name: Claudia García  : 1965  MRN: [id number]  Referring provider: Tre Urias*  Dx:   Encounter Diagnosis     ICD-10-CM    1. Lateral epicondylitis of right elbow  M77.11       2. Right elbow pain  M25.521                  Subjective: Patient reports no pain at start of session.      Objective: See treatment diary below       Precautions: Universal      Auth Tracker - NO AUTH BOMN 150 PCY   Auth Status Total   Visits  Expiration date Co-Insurance                                     Visit Tracker  Date IE  10/30 11/13 11/21 11/26  12/3  RE 12/5  X    12/9 12/11                                                                          PMHx:   has a past medical history of Asthma and Lyme disease.       Manuals HEP 2024                       Ther Ex     Education on HEP and dx x x5min   Extensor stretches 4 ways  x 5 x10-20 sec holds   STM lateral epicondyle/PROM x X8 min   measurements     Wrist flex/slow ext  2# x10    Digital extension with rubber band   Red x10    Kenny twist with flexbar  Red x10    Tabletop stretch  5 x10-20 sec holds   Flexor stretch   5 x10 sec   Prayer stretch   5 x10 sec   Supination/pronation  X10 1 wts.                  Ther Act                     Modalities     MHP  x5 min             Assessment:   Patient tolerated session well with good activity tolerance and minimal reports of pain at lateral epicondyle. Patient session focused on patient education on anatomy and physiology concerning current dx, techniques for decreasing deficits through HEP, and appropriate use of modalities. Patient benefiting from skilled hand therapy OT to reduce deficits to improve independence with daily activities.        Plan:   Focus on HEP, A/AA/PROM, TE, TA, manual therapy, modalities PRN, strengthening, activity modification to improve ability to complete daily activites with ease.  POC 12/3/2024 - 2025

## 2024-12-16 ENCOUNTER — OFFICE VISIT (OUTPATIENT)
Dept: OCCUPATIONAL THERAPY | Facility: CLINIC | Age: 59
End: 2024-12-16
Payer: COMMERCIAL

## 2024-12-16 DIAGNOSIS — M77.11 LATERAL EPICONDYLITIS OF RIGHT ELBOW: ICD-10-CM

## 2024-12-16 DIAGNOSIS — M25.521 RIGHT ELBOW PAIN: Primary | ICD-10-CM

## 2024-12-16 PROCEDURE — 97110 THERAPEUTIC EXERCISES: CPT

## 2024-12-16 NOTE — PROGRESS NOTES
Daily Note     Today's date: 2024  Patient name: Claudia García  : 1965  MRN: [id number]  Referring provider: Tre Urias*  Dx:   Encounter Diagnosis     ICD-10-CM    1. Right elbow pain  M25.521       2. Lateral epicondylitis of right elbow  M77.11                    Subjective: Patient reports no pain at start of session.      Objective: See treatment diary below       Precautions: Universal      Auth Tracker - NO AUTH BOMN 150 PCY   Auth Status Total   Visits  Expiration date Co-Insurance                                     Visit Tracker  Date IE  10/30 11/13 11/21 11/26  12/3  RE 12/5  X    12/9 12/11 12/16                                                                         PMHx:   has a past medical history of Asthma and Lyme disease.       Manuals HEP 2024                       Ther Ex     Education on HEP and dx x x5min   Extensor stretches 4 ways  x 5 x10-20 sec holds   STM lateral epicondyle/PROM x X8 min   measurements     Wrist flex/slow ext  2# x10    Digital extension with rubber band   Red x10    Kenny twist with flexbar  Red x10    Tabletop stretch  5 x10-20 sec holds   Flexor stretch   5 x10 sec   Prayer stretch   5 x10 sec   Supination/pronation  X10 1 wts.                  Ther Act                     Modalities     MHP  x5 min             Assessment:   Patient tolerated session well with good activity tolerance and minimal reports of pain at lateral epicondyle. Patient session focused on patient education on anatomy and physiology concerning current dx, techniques for decreasing deficits through HEP, and appropriate use of modalities. Patient benefiting from skilled hand therapy OT to reduce deficits to improve independence with daily activities.        Plan:   Focus on HEP, A/AA/PROM, TE, TA, manual therapy, modalities PRN, strengthening, activity modification to improve ability to complete daily activites with ease.  POC 12/3/2024 - 2025

## 2024-12-30 ENCOUNTER — OFFICE VISIT (OUTPATIENT)
Dept: OCCUPATIONAL THERAPY | Facility: CLINIC | Age: 59
End: 2024-12-30
Payer: COMMERCIAL

## 2024-12-30 DIAGNOSIS — M77.11 LATERAL EPICONDYLITIS OF RIGHT ELBOW: ICD-10-CM

## 2024-12-30 DIAGNOSIS — M25.521 RIGHT ELBOW PAIN: Primary | ICD-10-CM

## 2024-12-30 PROCEDURE — 97110 THERAPEUTIC EXERCISES: CPT

## 2024-12-30 NOTE — PROGRESS NOTES
Daily Note     Today's date: 2024  Patient name: Claudia García  : 1965  MRN: [id number]  Referring provider: Tre Urias*  Dx:   Encounter Diagnosis     ICD-10-CM    1. Right elbow pain  M25.521       2. Lateral epicondylitis of right elbow  M77.11                      Subjective: Patient reports no pain at start of session.      Objective: See treatment diary below       Precautions: Universal      Auth Tracker - NO AUTH BOMN 150 PCY   Auth Status Total   Visits  Expiration date Co-Insurance                                     Visit Tracker  Date IE  10/30 11/13 11/21 11/26  12/3  RE 12/5  X    12/9 12/11 12/16 12/30  D/C                                                                        PMHx:   has a past medical history of Asthma and Lyme disease.       Manuals HEP 2024                       Ther Ex     Education on HEP and dx x x5min   Extensor stretches 4 ways  x 5 x10-20 sec holds   STM lateral epicondyle/PROM x X8 min   measurements     Wrist flex/slow ext  2# x10    Digital extension with rubber band   Red x10    Kenny twist with flexbar  Red x10    Tabletop stretch  5 x10-20 sec holds   Flexor stretch   5 x10 sec   Prayer stretch   5 x10 sec   Supination/pronation  X10 1 wts.                  Ther Act                     Modalities     MHP  x5 min             Assessment:   Patient tolerated session well with good activity tolerance and no reports of pain at lateral epicondyle. Patient session focused on patient education on anatomy and physiology concerning current dx, techniques for decreasing deficits through HEP, and appropriate use of modalities. Patient is D/C to HEP at this time.    Plan:   D/C to HEP.